# Patient Record
(demographics unavailable — no encounter records)

---

## 2024-12-19 NOTE — PHYSICAL EXAM
[Well Developed] : well developed [Well Nourished] : well nourished [No Acute Distress] : no acute distress [Normal Conjunctiva] : normal conjunctiva [Normal Venous Pressure] : normal venous pressure [No Carotid Bruit] : no carotid bruit [Normal S1, S2] : normal S1, S2 [No Murmur] : no murmur [No Rub] : no rub [No Gallop] : no gallop [Clear Lung Fields] : clear lung fields [Good Air Entry] : good air entry [No Respiratory Distress] : no respiratory distress  [Soft] : abdomen soft [Non Tender] : non-tender [No Masses/organomegaly] : no masses/organomegaly [Normal Bowel Sounds] : normal bowel sounds [Normal Gait] : normal gait [No Edema] : no edema [No Cyanosis] : no cyanosis [No Clubbing] : no clubbing [No Varicosities] : no varicosities [Normal Radial B/L] : normal radial B/L [Normal PT B/L] : normal PT B/L [No Rash] : no rash [No Skin Lesions] : no skin lesions [Moves all extremities] : moves all extremities [No Focal Deficits] : no focal deficits [Normal Speech] : normal speech [Alert and Oriented] : alert and oriented [Normal memory] : normal memory [de-identified] : Few scabs left side of his head

## 2024-12-19 NOTE — HISTORY OF PRESENT ILLNESS
[FreeTextEntry1] : 38-year-old generally healthy man seen following episode of syncope.  He is accompanied by his wife.  He had an episode of syncope about 15 years ago.  He denies hypertension diabetes history of heart murmur rheumatic fever rhythm abnormality denied chest pain shortness of breath or palpitations.  He is working at a flower shop.  Got up to go to the water cooler felt like he might pass out week and nauseated and himself on the floor was transported by ambulance to the hospital where he was evaluated.  He indicates that he had postural hypotension was hydrated felt better was seen by cardiologist during the hospital stay.  Of note his 1-year-old has been sick at home and he felt a little nauseated all morning but had no nausea vomiting or diarrhea.  He feels well at this time.

## 2024-12-19 NOTE — DISCUSSION/SUMMARY
[FreeTextEntry1] : Discussed with patient and wife.  Obtain records for review precautions and hydration discussed. [EKG obtained to assist in diagnosis and management of assessed problem(s)] : EKG obtained to assist in diagnosis and management of assessed problem(s)

## 2024-12-19 NOTE — ASSESSMENT
[FreeTextEntry1] : Status post syncopal episode which may have been related to viral syndrome volume depletion.  Possible vagal etiology also considered.